# Patient Record
Sex: MALE | Race: OTHER | HISPANIC OR LATINO | URBAN - METROPOLITAN AREA
[De-identification: names, ages, dates, MRNs, and addresses within clinical notes are randomized per-mention and may not be internally consistent; named-entity substitution may affect disease eponyms.]

---

## 2020-08-16 ENCOUNTER — EMERGENCY (EMERGENCY)
Facility: HOSPITAL | Age: 30
LOS: 1 days | Discharge: HOME | End: 2020-08-16
Attending: STUDENT IN AN ORGANIZED HEALTH CARE EDUCATION/TRAINING PROGRAM | Admitting: STUDENT IN AN ORGANIZED HEALTH CARE EDUCATION/TRAINING PROGRAM
Payer: MEDICAID

## 2020-08-16 VITALS
OXYGEN SATURATION: 100 % | TEMPERATURE: 99 F | HEART RATE: 69 BPM | RESPIRATION RATE: 16 BRPM | DIASTOLIC BLOOD PRESSURE: 71 MMHG | SYSTOLIC BLOOD PRESSURE: 115 MMHG

## 2020-08-16 PROCEDURE — 10060 I&D ABSCESS SIMPLE/SINGLE: CPT

## 2020-08-16 PROCEDURE — 99282 EMERGENCY DEPT VISIT SF MDM: CPT | Mod: 25

## 2020-08-16 RX ORDER — LIDOCAINE HYDROCHLORIDE AND EPINEPHRINE 10; 10 MG/ML; UG/ML
10 INJECTION, SOLUTION INFILTRATION; PERINEURAL ONCE
Refills: 0 | Status: COMPLETED | OUTPATIENT
Start: 2020-08-16 | End: 2020-08-16

## 2020-08-16 RX ORDER — ACETAMINOPHEN 500 MG
650 TABLET ORAL ONCE
Refills: 0 | Status: COMPLETED | OUTPATIENT
Start: 2020-08-16 | End: 2020-08-16

## 2020-08-16 RX ADMIN — LIDOCAINE HYDROCHLORIDE AND EPINEPHRINE 10 MILLILITER(S): 10; 10 INJECTION, SOLUTION INFILTRATION; PERINEURAL at 12:17

## 2020-08-16 RX ADMIN — Medication 650 MILLIGRAM(S): at 12:16

## 2020-08-16 NOTE — ED PROCEDURE NOTE - ATTENDING CONTRIBUTION TO CARE
pantic: I have seen and examined the patient face to face.  I was present during the above procedure and  have reviewed and addended the procedure note.

## 2020-08-16 NOTE — ED PROVIDER NOTE - ATTENDING CONTRIBUTION TO CARE
28 y/o M with no PMH p/w left axillary pain and swelling. Pt denies trauma, states he noticed swelling in his arm now has pain. physically exam small area of induration possible abscess tdap UTD within 1 year. Pt states over last 3 days he had increased pain in left axilla worse with movement and palpation. He did not take any medications for the pain. otherwise he denies fever, chills, chest pain , SOB.  denies fever, chills, chest pain, SOB, abdominal pain, diarrhea, dysuria, syncope, bleeding, new rash, weakness, numbness, blurred vision    ROS  otherwise negative as per HPI  Gen: Awake, Alert, WD, WN, NAD  Head:  NC/AT  Eyes:  PERRL, EOMI, Conjunctiva pink, lids normal, no scleral icterus  ENT: OP clear, no exudates, no erythema, uvula midline, TMs clear bilaterally, moist mucus membranes  Neck: supple, nontender, no meningismus, no JVD, trachea midline  Cardiac/CV:  S1 S2, RRR, no M/G/R  Respiratory/Pulm:  CTAB, good air movement, normal resp effort, no wheezes/stridor/retractions/rales/rhonchi  Gastrointestinal/Abdomen:  Soft, nontender, nondistended, +BS, no rebound/guarding  Back:  no CVAT, no MLT  Ext:  warm, well perfused, moving all extremities spontaneously, no peripheral edema, distal pulses intact  Skin: small 2cm by 1cm area of induration in left axilla, likely due to abscess,  Neuro:  AAOx3, sensation intact, motor 5/5 x 4 extremities, normal gait, speech clear  MDM as above

## 2020-08-16 NOTE — ED PROVIDER NOTE - PHYSICAL EXAMINATION
Vida Aranda MD  GENERAL: Patient awake alert NAD.  HEENT: NC/AT, Moist mucous membranes, PERRL, EOMI.  LUNGS: CTAB, no wheezes or crackles.   CARDIAC: RRR, no m/r/g.    ABDOMEN: Soft, NT, ND, No rebound, guarding.  EXT: No edema.  MSK: no pain with movement, no deformities.  NEURO: A&Ox3. Moving all extremities.  SKIN: Warm and dry. No rash. +L axcilla abscess, about 2 mm in diameter by palpation, mild erythema 1mm, no cellulitis, no drainage  PSYCH: Normal affect.

## 2020-08-16 NOTE — ED PROVIDER NOTE - OBJECTIVE STATEMENT
The patient is a 29y Male complaining of abscess. He noticed the abscess 3 days ago. He describes it as a lump in his armpit and says it is very tender. Denies fevers, chills, or any other bump, lumps or rashes that he's noticed. Denies discharge from abscess, endorses mild erythema. Denies previous abscesses. No other medical problems, no DM. Denies N/V, abd pain, hematuria or melena.

## 2020-08-16 NOTE — ED ADULT TRIAGE NOTE - CHIEF COMPLAINT QUOTE
Pt. c/o pain to left side of chest into axilla starting three days ago. Noticed a "ball" to left axilla. Denies drainage or fevers.

## 2020-08-16 NOTE — ED PROVIDER NOTE - PROGRESS NOTE DETAILS
Dr. Kothari: I have personally seen and examined this patient at the bedside. I have fully participated in the care of this patient. I have reviewed all pertinent clinical information, including history, physical exam, plan and the Resident's note and agree except as noted. HPI above as by me. PE above as by me. DDX PLAN

## 2020-08-16 NOTE — ED PROVIDER NOTE - NSFOLLOWUPINSTRUCTIONS_ED_ALL_ED_FT
You were seen in the ED for an abscess. We numbed the area with lidocaine and did an incision and drainage of the abscess. This means we opened the abscess up and let the pus drain out. A little bit of pus may continue to drain for the next day. Keep the area clean and dry. Change the bandage as needed. For pain, use Tylenol 650mg every 6 hours.    Please follow up with your PCP in 2-3 days. Return to the ED if you experience any worsening or new symptoms including fevers, chills, increasing redness, increasing swelling, large amounts of pus.     Lo vieron en el servicio de urgencias por un absceso. Adormecimos el área con lidocaína e hicimos clemencia incisión y drenaje del absceso. Godley significa que abrimos el absceso y dejamos que drene el pus. Es posible que continúe drenando un poco de pus naseem el día siguiente. Mantenga el área limpia y seca. Cambie el vendaje según sea necesario. Para el dolor, use Tylenol 650 mg cada 6 horas.    Padilla un seguimiento con sharma PCP en 2-3 días. Regrese al servicio de urgencias si experimenta algún empeoramiento o síntomas nuevos, cherri fiebre, escalofríos, aumento del enrojecimiento, aumento de la hinchazón, grandes cantidades de pus.

## 2020-08-16 NOTE — ED PROVIDER NOTE - CLINICAL SUMMARY MEDICAL DECISION MAKING FREE TEXT BOX
30 y/o M with no PMH p/w left axillary pain and swelling. Pt denies trauma, states he noticed swelling in his arm now has pain. physically exam small area of induration possible abscess tdap UTD within 1 year.  I&D motrin

## 2020-08-16 NOTE — ED PROVIDER NOTE - NS ED ROS FT
GENERAL: No fever, chills  EYES: no vision changes, no discharge.   ENT: no difficulty swallowing or speaking   CARDIAC: no chest pain/pressure, SOB, lower extremity swelling  PULMONARY: no cough, SOB  GI: no abdominal pain, n/v/d  : no dysuria  SKIN: no rashes. + L axcilla abscess   NEURO: no headache, lightheadedness, paresthesia  MSK: No joint pain, myalgia, weakness.

## 2020-08-16 NOTE — ED PROVIDER NOTE - PATIENT PORTAL LINK FT
You can access the FollowMyHealth Patient Portal offered by [facility] by registering at the following website: [email or web address]. By joining [employer]s FollowMyHealth portal, you will also be able to view your health information using other applications (apps) compatible with our system.

## 2020-08-16 NOTE — ED PROVIDER NOTE - NSFOLLOWUPCLINICS_GEN_ALL_ED_FT
[facility] General Internal Medicine  General Internal Medicine  [address] [id number]  Phone: [phone]  Fax:   Follow Up Time: